# Patient Record
(demographics unavailable — no encounter records)

---

## 2025-07-16 NOTE — PHYSICAL EXAM
[Right] : right foot and ankle [Mild] : mild swelling of MTP joint/great toe [1st] : 1st [2+] : dorsalis pedis pulse: 2+ [] : mildly antalgic [FreeTextEntry9] : hallux mtp df 30

## 2025-07-16 NOTE — HISTORY OF PRESENT ILLNESS
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

## 2025-07-16 NOTE — IMAGING
[Weight -] : weightbearing [Degenerative change] : Degenerative change [de-identified] : hallux mtp

## 2025-07-16 NOTE — DISCUSSION/SUMMARY
[Surgical risks reviewed] : Surgical risks reviewed [de-identified] : The risks, benefits, alternatives have been discussed.  The risks include but are not limited to infection, bleeding, injury to small nerves and blood vessels, pain, stiffness, progression, dvt, PE, amputation and death.   Discussed shoe modifications Considering cheilectomy/phalangeal osteootmy

## 2025-07-28 NOTE — HISTORY OF PRESENT ILLNESS
[6] : 6 [0] : 0 [Dull/Aching] : dull/aching [Localized] : localized [Intermittent] : intermittent [Ice] : ice [Heat] : heat [] : yes [Full time] : Work status: full time